# Patient Record
Sex: FEMALE | Race: WHITE
[De-identification: names, ages, dates, MRNs, and addresses within clinical notes are randomized per-mention and may not be internally consistent; named-entity substitution may affect disease eponyms.]

---

## 2017-01-17 ENCOUNTER — HOSPITAL ENCOUNTER (EMERGENCY)
Dept: HOSPITAL 17 - NEPA | Age: 47
Discharge: HOME | End: 2017-01-17
Payer: COMMERCIAL

## 2017-01-17 VITALS
OXYGEN SATURATION: 99 % | TEMPERATURE: 98.3 F | SYSTOLIC BLOOD PRESSURE: 163 MMHG | DIASTOLIC BLOOD PRESSURE: 84 MMHG | RESPIRATION RATE: 12 BRPM | HEART RATE: 71 BPM

## 2017-01-17 VITALS
SYSTOLIC BLOOD PRESSURE: 161 MMHG | TEMPERATURE: 98.4 F | OXYGEN SATURATION: 98 % | RESPIRATION RATE: 16 BRPM | DIASTOLIC BLOOD PRESSURE: 74 MMHG | HEART RATE: 65 BPM

## 2017-01-17 VITALS — HEIGHT: 69 IN | WEIGHT: 231.49 LBS | BODY MASS INDEX: 34.29 KG/M2

## 2017-01-17 VITALS — DIASTOLIC BLOOD PRESSURE: 61 MMHG | SYSTOLIC BLOOD PRESSURE: 126 MMHG

## 2017-01-17 VITALS — OXYGEN SATURATION: 98 %

## 2017-01-17 DIAGNOSIS — Z86.74: ICD-10-CM

## 2017-01-17 DIAGNOSIS — Z95.810: ICD-10-CM

## 2017-01-17 DIAGNOSIS — I44.7: ICD-10-CM

## 2017-01-17 DIAGNOSIS — R00.1: ICD-10-CM

## 2017-01-17 DIAGNOSIS — I44.0: ICD-10-CM

## 2017-01-17 DIAGNOSIS — R07.9: Primary | ICD-10-CM

## 2017-01-17 LAB
ANION GAP SERPL CALC-SCNC: 8 MEQ/L (ref 5–15)
APTT BLD: 26.5 SEC (ref 24.3–30.1)
BASOPHILS # BLD AUTO: 0 TH/MM3 (ref 0–0.2)
BASOPHILS NFR BLD: 0.6 % (ref 0–2)
BUN SERPL-MCNC: 23 MG/DL (ref 7–18)
CHLORIDE SERPL-SCNC: 103 MEQ/L (ref 98–107)
CK SERPL-CCNC: 52 U/L (ref 26–192)
EOSINOPHIL # BLD: 0.2 TH/MM3 (ref 0–0.4)
EOSINOPHIL NFR BLD: 3.1 % (ref 0–4)
ERYTHROCYTE [DISTWIDTH] IN BLOOD BY AUTOMATED COUNT: 13.8 % (ref 11.6–17.2)
GFR SERPLBLD BASED ON 1.73 SQ M-ARVRAT: 82 ML/MIN (ref 89–?)
HCO3 BLD-SCNC: 27.2 MEQ/L (ref 21–32)
HCT VFR BLD CALC: 39.7 % (ref 35–46)
HEMO FLAGS: (no result)
INR PPP: 1 RATIO
LYMPHOCYTES # BLD AUTO: 2.8 TH/MM3 (ref 1–4.8)
LYMPHOCYTES NFR BLD AUTO: 34.5 % (ref 9–44)
MAGNESIUM SERPL-MCNC: 2.1 MG/DL (ref 1.5–2.5)
MCH RBC QN AUTO: 29.8 PG (ref 27–34)
MCHC RBC AUTO-ENTMCNC: 34 % (ref 32–36)
MCV RBC AUTO: 87.7 FL (ref 80–100)
MONOCYTES NFR BLD: 8 % (ref 0–8)
NEUTROPHILS # BLD AUTO: 4.4 TH/MM3 (ref 1.8–7.7)
NEUTROPHILS NFR BLD AUTO: 53.8 % (ref 16–70)
PLATELET # BLD: 183 TH/MM3 (ref 150–450)
POTASSIUM SERPL-SCNC: 4.4 MEQ/L (ref 3.5–5.1)
PROTHROMBIN TIME: 10.6 SEC (ref 9.8–11.6)
RBC # BLD AUTO: 4.52 MIL/MM3 (ref 4–5.3)
SODIUM SERPL-SCNC: 138 MEQ/L (ref 136–145)
WBC # BLD AUTO: 8.1 TH/MM3 (ref 4–11)

## 2017-01-17 PROCEDURE — 84484 ASSAY OF TROPONIN QUANT: CPT

## 2017-01-17 PROCEDURE — 71010: CPT

## 2017-01-17 PROCEDURE — 82550 ASSAY OF CK (CPK): CPT

## 2017-01-17 PROCEDURE — 85610 PROTHROMBIN TIME: CPT

## 2017-01-17 PROCEDURE — 85730 THROMBOPLASTIN TIME PARTIAL: CPT

## 2017-01-17 PROCEDURE — 93005 ELECTROCARDIOGRAM TRACING: CPT

## 2017-01-17 PROCEDURE — 83735 ASSAY OF MAGNESIUM: CPT

## 2017-01-17 PROCEDURE — 85025 COMPLETE CBC W/AUTO DIFF WBC: CPT

## 2017-01-17 PROCEDURE — 80048 BASIC METABOLIC PNL TOTAL CA: CPT

## 2017-01-17 NOTE — PD
HPI


Chief Complaint:  Cardiac Complaint


Time Seen by Provider:  10:18


Travel History


International Travel<30 days:  No


Contact w/Intl Traveler<30days:  No


Traveled to known affect area:  No





History of Present Illness


HPI


46-year-old female came to the emergency room because she thinks that last 

night her AICD might have fired since she felt a sharp twinge.  Patient had the 

AICD put in last October after she had a cardiac arrest and was resuscitated.  

Currently she is not having any chest pain.  She is quite anxious though.  

Vital signs are stable. No h/o cardiac stents.





[facility]


Past Medical History


*** Narrative Medical


List of her past medical history as reviewed from the nursing note.


Cardiovascular Problems:  Yes


Diabetes:  No


Diminished Hearing:  No


Immunizations Current:  Yes


Menopausal:  No





Past Surgical History


AICD:  Yes (Placed in October - [place])


Appendectomy:  Yes


Body Medical Devices:  DEFIBRILLATOR





Social History


Alcohol Use:  Yes (ONCE A WK)


Tobacco Use:  No


Substance Use:  No





Allergies-Medications


(Allergen,Severity, Reaction):  


Coded Allergies:  


     No Known Allergies (Verified , 11/28/16)


     UNOBTAINABLE (Unverified , 11/28/16)


Comments


Unknown


Reported Meds & Prescriptions





Reported Meds & Active Scripts


Active


Aldactone (Spironolactone) 25 Mg Tab 25 Mg PO BID@09,18


Lisinopril 5 Mg Tab 5 Mg PO DAILY


Amiodarone (Amiodarone HCl) 200 Mg Tab 200 Mg PO Q12HR


Metoprolol Tartrate 25 Mg Tab 12.5 Mg PO BID


Reported


Aspirin Children's (Aspirin) 81 Mg Chew 81 Mg CHEW DAILY





Narrative Medication


List of her past medical history is reviewed from the nursing note.





Review of Systems


Except as stated in HPI:  all other systems reviewed are Neg





Physical Exam


Narrative


GENERAL: Awake, alert, obese, anxious


SKIN: Warm and dry.


HEAD: Atraumatic. Normocephalic. 


EYES: Pupils equal and round. No scleral icterus. No injection or drainage. 


ENT: No nasal bleeding or discharge.  Mucous membranes pink and moist.


NECK: Trachea midline. No JVD. 


CARDIOVASCULAR: Regular rate and rhythm.  No murmur appreciated.


RESPIRATORY: No accessory muscle use. Clear to auscultation. Breath sounds 

equal bilaterally. 


GASTROINTESTINAL: Abdomen soft, non-tender, nondistended. Hepatic and splenic 

margins not palpable. 


MUSCULOSKELETAL: No obvious deformities. No clubbing.  No cyanosis.  No edema. 


NEUROLOGICAL: Awake and alert. No obvious cranial nerve deficits.  Motor 

grossly within normal limits. Normal speech.


PSYCHIATRIC: Appropriate mood and affect; insight and judgment normal.





Data


Data


Last Documented VS





Vital Signs








  Date Time  Temp Pulse Resp B/P Pulse Ox O2 Delivery O2 Flow Rate FiO2


 


1/17/17 13:04  57 20 126/61 100   


 


1/17/17 10:33      Room Air  


 


1/17/17 10:22 98.4       








Orders





 Electrocardiogram (1/17/17 )


Basic Metabolic Panel (Bmp) (1/17/17 10:30)


Ckmb (Isoenzyme) Profile (1/17/17 10:30)


Complete Blood Count With Diff (1/17/17 10:30)


Magnesium (Mg) (1/17/17 10:30)


Prothrombin Time / Inr (Pt) (1/17/17 10:30)


Act Partial Throm Time (Ptt) (1/17/17 10:30)


Troponin I (1/17/17 10:30)


Chest, Single Ap (1/17/17 10:30)


Ecg Monitoring (1/17/17 10:30)


Bilateral Bp Monitoring (1/17/17 10:30)


Iv Access Insert/Monitor (1/17/17 10:30)


Oximetry (1/17/17 10:30)


Oxygen Administration (1/17/17 10:30)


Aspirin Chew (Aspirin Chew) (1/17/17 10:30)


Sodium Chloride 0.9% Flush (Ns Flush) (1/17/17 10:30)





Labs





 Laboratory Tests








Test 1/17/17





 10:50


 


White Blood Count 8.1 TH/MM3


 


Red Blood Count 4.52 MIL/MM3


 


Hemoglobin 13.5 GM/DL


 


Hematocrit 39.7 %


 


Mean Corpuscular Volume 87.7 FL


 


Mean Corpuscular Hemoglobin 29.8 PG


 


Mean Corpuscular Hemoglobin 34.0 %





Concent 


 


Red Cell Distribution Width 13.8 %


 


Platelet Count 183 TH/MM3


 


Mean Platelet Volume 8.9 FL


 


Neutrophils (%) (Auto) 53.8 %


 


Lymphocytes (%) (Auto) 34.5 %


 


Monocytes (%) (Auto) 8.0 %


 


Eosinophils (%) (Auto) 3.1 %


 


Basophils (%) (Auto) 0.6 %


 


Neutrophils # (Auto) 4.4 TH/MM3


 


Lymphocytes # (Auto) 2.8 TH/MM3


 


Monocytes # (Auto) 0.6 TH/MM3


 


Eosinophils # (Auto) 0.2 TH/MM3


 


Basophils # (Auto) 0.0 TH/MM3


 


CBC Comment DIFF FINAL 


 


Differential Comment  


 


Prothrombin Time 10.6 SEC


 


Prothromb Time International 1.0 RATIO





Ratio 


 


Activated Partial 26.5 SEC





Thromboplast Time 


 


Sodium Level 138 MEQ/L


 


Potassium Level 4.4 MEQ/L


 


Chloride Level 103 MEQ/L


 


Carbon Dioxide Level 27.2 MEQ/L


 


Anion Gap 8 MEQ/L


 


Blood Urea Nitrogen 23 MG/DL


 


Creatinine 0.76 MG/DL


 


Estimat Glomerular Filtration 82 ML/MIN





Rate 


 


Random Glucose 72 MG/DL


 


Calcium Level 8.3 MG/DL


 


Magnesium Level 2.1 MG/DL


 


Total Creatine Kinase 52 U/L


 


Troponin I 0.04 NG/ML











MDM


Medical Decision Making


Medical Screen Exam Complete:  Yes


Emergency Medical Condition:  Yes


Medical Record Reviewed:  Yes


Interpretation(s)


Twelve-lead EKG was reviewed by me.  Normal sinus rhythm, left axis deviation, 

first-degree AV block, interventricular conduction delay.  Heart rate of 62 bpm.


Differential Diagnosis


AICD firing, chest wall pain


Narrative Course


11:50 AM blood test results of back and within acceptable limits.  We have put 

a call out for [employer] which is her make of the device.  I have asked them to 

come and interrogate the device to see if it was fired.





12:01 PM awaiting for [employer] to come and interrogate her device.





12:29 PM the representative from [employer] came and let me know that after 

interrogation he did not find the device firing.  I will discharge the patient 

home and have her follow up with her cardiologist.





Procedures


EKG Prior to Arrival:  Yes





Diagnosis





 Primary Impression:  


 Nonspecific chest pain


Referrals:  


Rad Guan MD


2 days





***Additional Instructions:


Please return to the ER if the condition worsens or any other new concerns.  

Otherwise follow-up with your cardiologist in couple days.


***Med/Other Pt SpecificInfo:  No Change to Meds


Disposition:  01 DISCHARGE HOME


Condition:  Stable








Laxmi Norton MD Jan 17, 2017 10:19

## 2017-01-17 NOTE — RADRPT
EXAM DATE/TIME:  01/17/2017 10:30 

 

[facility] COMPARISON:     

CHEST SINGLE AP, November 21, 2016, 13:26.

 

                     

INDICATIONS:     

Chest pain.

                     

 

MEDICAL HISTORY:     

None.          

 

SURGICAL HISTORY:     

Pacemaker.   

 

ENCOUNTER:     

Initial                                        

 

ACUITY:     

1 day      

 

PAIN SCORE:     

4/10

 

LOCATION:     

Left upper chest 

 

FINDINGS:     

Patient has a single lead pacemaker in place from the left subclavian approach.  The heart size is mi
ldly enlarged.  The lungs are grossly clear.  No pleural effusion is seen.  

 

CONCLUSION:     

 

1.  No acute abnormality seen.

2.  Cardiomegaly with a pacing device in place. 

 

 Jorge Luis Fernández MD on January 17, 2017 at 10:58                

Board Certified Radiologist.

 This report was verified electronically.

## 2017-01-18 NOTE — EKG
Date Performed: 01/17/2017       Time Performed: 10:31:25

 

PTAGE:      46 years

 

EKG:      Sinus bradycardia Left axis deviation Left bundle branch block First degree AV block Since 
previous tracing, no significant change noted ABNORMAL ECG

 

PREVIOUS TRACING       : 11/21/2016 16.52

 

DOCTOR:   Charly Mckeon  Interpretating Date/Time  01/18/2017 07:57:33

## 2017-07-06 ENCOUNTER — HOSPITAL ENCOUNTER (EMERGENCY)
Dept: HOSPITAL 17 - PHED | Age: 47
Discharge: HOME | End: 2017-07-06
Payer: MEDICAID

## 2017-07-06 VITALS
DIASTOLIC BLOOD PRESSURE: 53 MMHG | OXYGEN SATURATION: 99 % | RESPIRATION RATE: 20 BRPM | HEART RATE: 54 BPM | SYSTOLIC BLOOD PRESSURE: 112 MMHG

## 2017-07-06 VITALS
RESPIRATION RATE: 16 BRPM | HEART RATE: 69 BPM | TEMPERATURE: 98 F | OXYGEN SATURATION: 97 % | SYSTOLIC BLOOD PRESSURE: 137 MMHG | DIASTOLIC BLOOD PRESSURE: 85 MMHG

## 2017-07-06 VITALS
DIASTOLIC BLOOD PRESSURE: 54 MMHG | HEART RATE: 63 BPM | OXYGEN SATURATION: 98 % | RESPIRATION RATE: 20 BRPM | SYSTOLIC BLOOD PRESSURE: 92 MMHG

## 2017-07-06 VITALS — BODY MASS INDEX: 38.37 KG/M2 | WEIGHT: 259.04 LBS | HEIGHT: 69 IN

## 2017-07-06 VITALS — OXYGEN SATURATION: 98 %

## 2017-07-06 VITALS
DIASTOLIC BLOOD PRESSURE: 65 MMHG | RESPIRATION RATE: 20 BRPM | OXYGEN SATURATION: 97 % | SYSTOLIC BLOOD PRESSURE: 132 MMHG | HEART RATE: 53 BPM

## 2017-07-06 DIAGNOSIS — K29.00: Primary | ICD-10-CM

## 2017-07-06 LAB
ALP SERPL-CCNC: 101 U/L (ref 45–117)
ALT SERPL-CCNC: 358 U/L (ref 10–53)
ANION GAP SERPL CALC-SCNC: 8 MEQ/L (ref 5–15)
APTT BLD: 26.1 SEC (ref 24.3–30.1)
AST SERPL-CCNC: 340 U/L (ref 15–37)
BASOPHILS # BLD AUTO: 0 TH/MM3 (ref 0–0.2)
BASOPHILS NFR BLD: 0.2 % (ref 0–2)
BILIRUB SERPL-MCNC: 1.6 MG/DL (ref 0.2–1)
BUN SERPL-MCNC: 19 MG/DL (ref 7–18)
CHLORIDE SERPL-SCNC: 104 MEQ/L (ref 98–107)
EOSINOPHIL # BLD: 0.2 TH/MM3 (ref 0–0.4)
EOSINOPHIL NFR BLD: 2.1 % (ref 0–4)
ERYTHROCYTE [DISTWIDTH] IN BLOOD BY AUTOMATED COUNT: 13.2 % (ref 11.6–17.2)
GFR SERPLBLD BASED ON 1.73 SQ M-ARVRAT: 66 ML/MIN (ref 89–?)
HCO3 BLD-SCNC: 26.7 MEQ/L (ref 21–32)
HCT VFR BLD CALC: 40.8 % (ref 35–46)
HEMO FLAGS: (no result)
INR PPP: 0.9 RATIO
LYMPHOCYTES # BLD AUTO: 1.7 TH/MM3 (ref 1–4.8)
LYMPHOCYTES NFR BLD AUTO: 21.9 % (ref 9–44)
MCH RBC QN AUTO: 31.1 PG (ref 27–34)
MCHC RBC AUTO-ENTMCNC: 34.2 % (ref 32–36)
MCV RBC AUTO: 90.8 FL (ref 80–100)
MONOCYTES NFR BLD: 5.5 % (ref 0–8)
NEUTROPHILS # BLD AUTO: 5.4 TH/MM3 (ref 1.8–7.7)
NEUTROPHILS NFR BLD AUTO: 70.3 % (ref 16–70)
PLATELET # BLD: 156 TH/MM3 (ref 150–450)
POTASSIUM SERPL-SCNC: 4.3 MEQ/L (ref 3.5–5.1)
PROTHROMBIN TIME: 10.3 SEC (ref 9.8–11.6)
RBC # BLD AUTO: 4.49 MIL/MM3 (ref 4–5.3)
SODIUM SERPL-SCNC: 139 MEQ/L (ref 136–145)
WBC # BLD AUTO: 7.7 TH/MM3 (ref 4–11)

## 2017-07-06 PROCEDURE — 83690 ASSAY OF LIPASE: CPT

## 2017-07-06 PROCEDURE — 85610 PROTHROMBIN TIME: CPT

## 2017-07-06 PROCEDURE — 85025 COMPLETE CBC W/AUTO DIFF WBC: CPT

## 2017-07-06 PROCEDURE — 85730 THROMBOPLASTIN TIME PARTIAL: CPT

## 2017-07-06 PROCEDURE — 96361 HYDRATE IV INFUSION ADD-ON: CPT

## 2017-07-06 PROCEDURE — 96374 THER/PROPH/DIAG INJ IV PUSH: CPT

## 2017-07-06 PROCEDURE — C9113 INJ PANTOPRAZOLE SODIUM, VIA: HCPCS

## 2017-07-06 PROCEDURE — 80053 COMPREHEN METABOLIC PANEL: CPT

## 2017-07-06 PROCEDURE — 93005 ELECTROCARDIOGRAM TRACING: CPT

## 2017-07-06 PROCEDURE — 99285 EMERGENCY DEPT VISIT HI MDM: CPT

## 2017-07-06 PROCEDURE — 96375 TX/PRO/DX INJ NEW DRUG ADDON: CPT

## 2017-07-06 PROCEDURE — 74177 CT ABD & PELVIS W/CONTRAST: CPT
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
## 2017-07-06 NOTE — EKG
Date Performed: 07/06/2017       Time Performed: 11:12:27

 

PTAGE:      46 years

 

EKG:      SINUS BRADYCARDIA WITH FIRST DEGREE AV BLOCK MARKED LEFT AXIS DEVIATION LEFT BUNDLE BRANCH 
BLOCK ABNORMAL ECG

 

PREVIOUS TRACING       : 01/17/2017 10.31 Compared to prior tracing no significant change

 

DOCTOR:   Sherry Sánchez  Interpretating Date/Time  07/06/2017 18:00:12

## 2017-07-06 NOTE — RADRPT
EXAM DATE/TIME:  07/06/2017 12:07 

 

[facility] COMPARISON:     

CT ABDOMEN & PELVIS W CONTRAST, October 09, 2016, 14:03.

 

 

INDICATIONS :     

Epigastric pain.  Nausea and vomiting. 

                      

 

IV CONTRAST:     

85 cc Omnipaque 350 (iohexol) IV 

 

 

ORAL CONTRAST:      

No oral contrast ingested.

                      

 

RADIATION DOSE:     

22.34 CTDIvol (mGy) 

 

 

MEDICAL HISTORY :     

Hypertension. Congestive heart failure. Cervical cancer. 

 

SURGICAL HISTORY :      

Appendectomy. Defibrillator.

 

ENCOUNTER:      

Initial

 

ACUITY:      

2 days

 

PAIN SCALE:      

5/10

 

LOCATION:         

Epigastric

 

TECHNIQUE:     

Volumetric scanning of the abdomen and pelvis was performed.  Using automated exposure control and ad
justment of the mA and/or kV according to patient size, radiation dose was kept as low as reasonably 
achievable to obtain optimal diagnostic quality images.  DICOM format image data is available electro
nically for review and comparison.  

 

FINDINGS:     

 

LOWER LUNGS:     

The visualized lower lungs are clear. Pacing wires are present in the right heart.

 

LIVER:     

In the right posterior liver in the region of segment 7 there is a hypodense lobulated mass measuring
 2.8 x 2.6 cm. There is potentially peripheral nodular enhancement. This lesion appears larger than o
n the prior study however the lesion also had the suggestion of peripheral nodular enhancement.  Ther
e is no dilation of the biliary tree.  No calcified gallstones.

 

SPLEEN:     

Normal size without lesion.

 

PANCREAS:     

Within normal limits.

 

KIDNEYS:     

Normal in size and shape.  There is no mass, stone or hydronephrosis.

 

ADRENAL GLANDS:     

There is a left adrenal gland mass measuring 19 mm. Hounsfield measurements are not indicative of a a
n adenoma on this examination. Right adrenal gland is normal. The left adrenal gland mass is stable.

 

VASCULAR:     

There is no aortic aneurysm. There is mild atherosclerotic disease.

 

BOWEL/MESENTERY:     

The stomach, small bowel, and colon demonstrate no acute abnormality.  There is no free intraperitone
al air or fluid.

 

ABDOMINAL WALL:     

Within normal limits.

 

RETROPERITONEUM:     

There is no lymphadenopathy. 

 

BLADDER:     

No wall thickening or mass. 

 

REPRODUCTIVE:     

Within normal limits.

 

INGUINAL:     

There is no lymphadenopathy or hernia. 

 

MUSCULOSKELETAL:     

No acute osseous abnormality is identified. There is sclerosis in the left sacrum adjacent to the SI 
joint.

 

CONCLUSION:     

1. No acute finding is identified in the abdomen or pelvis to explain the clinical symptoms.

2. There is a 2.8 cm a right lobe liver lesion in segment 7. It appears slightly larger than on the p
rior study. Imaging features could potentially represent a hemangioma. Based on the procedure in size
 and nonspecific enhancement pattern suggest liver MRI with and without intravenous contrast on an el
ective basis.

3. Stable 19 mm left adrenal gland mass. It does not meet criteria for an adenoma on this study. The 
stability since October 2016 supports a benign process, possibly an adenoma. If MRI is performed for 
the liver lesion attention can be paid to this lesion at that time.

 

 

 

 Jorge Luis Ontiveros MD on July 06, 2017 at 12:37           

Board Certified Radiologist.

 This report was verified electronically.

## 2017-11-19 ENCOUNTER — HOSPITAL ENCOUNTER (EMERGENCY)
Dept: HOSPITAL 17 - PHEFT | Age: 47
Discharge: HOME | End: 2017-11-19
Payer: MEDICAID

## 2017-11-19 VITALS — HEIGHT: 69 IN | BODY MASS INDEX: 39.51 KG/M2 | WEIGHT: 266.76 LBS

## 2017-11-19 VITALS
DIASTOLIC BLOOD PRESSURE: 65 MMHG | RESPIRATION RATE: 16 BRPM | OXYGEN SATURATION: 95 % | HEART RATE: 64 BPM | TEMPERATURE: 98.4 F | SYSTOLIC BLOOD PRESSURE: 135 MMHG

## 2017-11-19 DIAGNOSIS — F17.210: ICD-10-CM

## 2017-11-19 DIAGNOSIS — I10: ICD-10-CM

## 2017-11-19 DIAGNOSIS — J02.9: Primary | ICD-10-CM

## 2017-11-19 PROCEDURE — 99283 EMERGENCY DEPT VISIT LOW MDM: CPT

## 2017-11-19 NOTE — PD
HPI


Chief Complaint:  ENT Complaint


Time Seen by Provider:  11:04


Travel History


International Travel<30 days:  No


Contact w/Intl Traveler<30days:  No


Traveled to known affect area:  No





History of Present Illness


HPI


47-year-old female here for evaluation of sore throat and swollen glands times 

one day.  She is reporting subjective fever.  She denies difficulty swallowing 

or change in voice.  Symptom severity is mild.  No alleviating factors.





PFSH


Past Medical History


Hx Anticoagulant Therapy:  Yes (ASPIRIN)


Cardiovascular Problems:  Yes (CARDIAC ARREST )


Diabetes:  No


Diminished Hearing:  No


Hypertension:  Yes


Immunizations Current:  Yes


Pregnant?:  Not Pregnant


Menopausal:  No





Past Surgical History


AICD:  Yes (BIOTRONIK ITREVIA 7 VR-T DX, REF#[id number], S#[id number], PID#[id number])


Appendectomy:  Yes


Body Medical Devices:  DEFIBRILLATOR


Cardiac Surgery:  Yes (OPEN HEART X'S 2 )





Social History


Alcohol Use:  Yes (occ)


Tobacco Use:  Yes (2 CIGARETTES PER DAY. )


Substance Use:  No





Allergies-Medications


(Allergen,Severity, Reaction):  


Coded Allergies:  


     No Known Allergies (Verified  Adverse Reaction, Unknown, 11/19/17)


Reported Meds & Prescriptions





Reported Meds & Active Scripts


Active


Aldactone (Spironolactone) 25 Mg Tab 25 Mg PO BID@09,18


Amiodarone (Amiodarone HCl) 200 Mg Tab 200 Mg PO Q12HR


Reported


Metoprolol Tartrate 25 Mg Tab 12.5 Mg PO BID








Review of Systems


Except as stated in HPI:  all other systems reviewed are Neg


General / Constitutional:  Positive: Fever


Eyes:  No: Visual changes


HENT:  Positive: Sore Throat, No: Headaches


Cardiovascular:  No: Chest Pain or Discomfort


Respiratory:  No: Shortness of Breath


Gastrointestinal:  No: Abdominal Pain


Genitourinary:  No: Dysuria


Musculoskeletal:  No: Pain


Skin:  No Rash





Physical Exam


Narrative


GENERAL: Well-nourished, well-developed patient.


SKIN: Focused skin assessment warm/dry.  No rash


HEAD: Normocephalic.


EYES: No scleral icterus. No injection or drainage. 


THROAT: Posterior pharyngeal erythema with tonsillar hypertrophy without 

exudate.  Uvula is midline.  Airways patent.


NECK: Supple, trachea midline.  Mildly enlarged tender submandibular lymph 

nodes.  No meningismus


CARDIOVASCULAR: Regular rate and rhythm without murmurs, gallops, or rubs. 


RESPIRATORY: Breath sounds equal bilaterally. No accessory muscle use.





Data


Data


Last Documented VS





Vital Signs








  Date Time  Temp Pulse Resp B/P (MAP) Pulse Ox O2 Delivery O2 Flow Rate FiO2


 


11/19/17 10:38 98.4 64 16 135/65 (88) 95   











MDM


Medical Decision Making


Medical Screen Exam Complete:  Yes


Emergency Medical Condition:  Yes


Differential Diagnosis


Strep pharyngitis, viral pharyngitis, mononucleosis, URI


Narrative Course


47 year old female here with sore throat and swollen lymph nodes times one day.

  Severity is mild.  Patient is nontoxic-appearing.  Vital signs are stable.  

Airway is patent.





Diagnosis





 Primary Impression:  


 Pharyngitis


 Qualified Codes:  J02.9 - Acute pharyngitis, unspecified


Referrals:  


Primary Care Physician





***Additional Instructions:  


Take over-the-counter Tylenol or Motrin as needed for pain and fever.


Stay well hydrated by drinking plenty fluids.


Take the medication as prescribed.


Scripts


Amoxicillin (Amoxicillin) 500 Mg Cap


500 MG PO TID for Infection for 10 Days, CAP 0 Refills


   Prov: Desirae Kendrick         11/19/17


Disposition:  01 DISCHARGE HOME


Condition:  Stable











Desirae Kednrick Nov 19, 2017 11:26

## 2017-12-28 ENCOUNTER — HOSPITAL ENCOUNTER (EMERGENCY)
Dept: HOSPITAL 17 - PHED | Age: 47
Discharge: HOME | End: 2017-12-28
Payer: MEDICAID

## 2017-12-28 VITALS — HEIGHT: 68 IN | BODY MASS INDEX: 40.76 KG/M2 | WEIGHT: 268.96 LBS

## 2017-12-28 VITALS
SYSTOLIC BLOOD PRESSURE: 149 MMHG | RESPIRATION RATE: 18 BRPM | HEART RATE: 60 BPM | TEMPERATURE: 97.9 F | DIASTOLIC BLOOD PRESSURE: 75 MMHG | OXYGEN SATURATION: 97 %

## 2017-12-28 VITALS
SYSTOLIC BLOOD PRESSURE: 149 MMHG | OXYGEN SATURATION: 97 % | RESPIRATION RATE: 16 BRPM | DIASTOLIC BLOOD PRESSURE: 75 MMHG | HEART RATE: 60 BPM | TEMPERATURE: 97.9 F

## 2017-12-28 DIAGNOSIS — I10: ICD-10-CM

## 2017-12-28 DIAGNOSIS — K11.20: Primary | ICD-10-CM

## 2017-12-28 DIAGNOSIS — Z79.82: ICD-10-CM

## 2017-12-28 PROCEDURE — 99284 EMERGENCY DEPT VISIT MOD MDM: CPT

## 2017-12-28 PROCEDURE — 70490 CT SOFT TISSUE NECK W/O DYE: CPT

## 2017-12-28 NOTE — PD
HPI


Chief Complaint:  ENT Complaint


Time Seen by Provider:  03:50


Travel History


International Travel<30 days:  No


Contact w/Intl Traveler<30days:  No


Traveled to known affect area:  No





History of Present Illness


HPI


This is a 47-year-old female who presents to the emergency department with 

swelling of her right face that started this evening, constant, worsening, 

associated with some pain with swallowing.  She's never had pain like this 

before.  She denies having been scratched by cat.  She denies any fevers or 

chills.





PFSH


Past Medical History


Hx Anticoagulant Therapy:  Yes (ASPIRIN)


Cardiovascular Problems:  Yes


Diabetes:  No


Diminished Hearing:  No


Hypertension:  Yes


Immunizations Current:  Yes


Tetanus Vaccination:  Unknown


Influenza Vaccination:  No


Pregnant?:  Not Pregnant


Menopausal:  No





Past Surgical History


AICD:  Yes (BIOTRONIK ITREVIA 7 VR-T DX, REF#[id number], S#[id number], PID#[id number])


Appendectomy:  Yes


Body Medical Devices:  DEFIBRILLATOR


Cardiac Surgery:  Yes (OPEN HEART X'S 2 )





Social History


Alcohol Use:  No


Tobacco Use:  No


Substance Use:  No





Allergies-Medications


(Allergen,Severity, Reaction):  


Coded Allergies:  


     No Known Allergies (Verified  Adverse Reaction, Unknown, 11/19/17)


Reported Meds & Prescriptions





Reported Meds & Active Scripts


Active


Amoxicillin 500 Mg Cap 500 Mg PO TID 10 Days


Aldactone (Spironolactone) 25 Mg Tab 25 Mg PO BID@09,18


Amiodarone (Amiodarone HCl) 200 Mg Tab 200 Mg PO Q12HR


Reported


Metoprolol Tartrate 25 Mg Tab 12.5 Mg PO BID








Review of Systems


Except as stated in HPI:  all other systems reviewed are Neg





Physical Exam


Narrative


GENERAL:Well appearing, no acute distress


SKIN: Focused skin assessment warm and dry.


HEAD: Atraumatic. Normocephalic. 


EYES: Pupils equal and round.  No injection or drainage. 


ENT:  Moist mucous membranes.  No obvious salivary stone on exam.  Fullness in 

the right neck below the mandible.


NECK: Trachea midline. 


CARDIOVASCULAR: Regular rate and rhythm.  No murmur appreciated.


RESPIRATORY: Clear to auscultation. Breath sounds equal bilaterally. 


GASTROINTESTINAL: Abdomen soft, non-tender, nondistended. 


MUSCULOSKELETAL: No obvious deformities. 


NEUROLOGICAL: Awake and alert. No obvious cranial nerve deficits.  Moving all 

extremities.


PSYCHIATRIC: Appropriate mood and affect; insight and judgment normal.





Data


Data


Last Documented VS





Vital Signs








  Date Time  Temp Pulse Resp B/P (MAP) Pulse Ox O2 Delivery O2 Flow Rate FiO2


 


12/28/17 03:53 97.9 60 16 149/75 (99) 97   








Orders





 Orders


Ct Soft Tiss Neck W/O Iv Cont (12/28/17 )








MDM


Medical Decision Making


Medical Screen Exam Complete:  Yes


Emergency Medical Condition:  Yes


Differential Diagnosis


Sialoadenitis, cat scratch disease, lymphadenopathy, tumor


Narrative Course


This is a 47-year-old female who presents to the emergency department with 

swelling in her right face.  CT scan confirms sialoadenitis.  She is otherwise 

well-appearing.  I think she can be discharged with conservative management.





Diagnosis





 Primary Impression:  


 Sialoadenitis of submandibular gland


Patient Instructions:  General Instructions





***Additional Instructions:  


If you develop fever, chills or worsening swelling or difficulty swallowing or 

breathing return to the emergency room.


***Med/Other Pt SpecificInfo:  No Change to Meds


Disposition:  01 DISCHARGE HOME


Condition:  Stable











aPtience Witt MD Dec 28, 2017 05:02

## 2017-12-28 NOTE — RADRPT
EXAM DATE/TIME:  12/28/2017 04:20 

 

[facility] COMPARISON:     

No previous studies available for comparison.

 

 

INDICATIONS :     

Right sided neck pain in mandible area. Evaluate for stone in gland.

                      

 

RADIATION DOSE:     

22.52 CTDIvol (mGy) 

 

 

MEDICAL HISTORY :     

Cardiovascular disease. Hypertension. Anticoagulant therapy. 

 

SURGICAL HISTORY :       

Open heart surgery. Defibrillator.

 

ENCOUNTER:      

Initial

 

ACUITY:      

1 day

 

PAIN SCORE:      

6/10

 

LOCATION:       

Right neck 

 

TECHNIQUE:     

Volumetric scanning of the neck was performed.  Using automated exposure control and adjustment of th
e mA and/or kV according to patient size, radiation dose was kept as low as reasonably achievable to 
obtain optimal diagnostic quality images.  DICOM format image data is available electronically for re
view and comparison.  

 

FINDINGS:     

There is no evidence for any appreciable pathological adenopathy in the patient's neck. There is an a
pproximate 2-3 mm stone in the right submandibular gland which is also swollen with inflammation surr
ounding the submandibular gland. There is no evidence for abscess. The parotid glands, thyroid glands
 appear intact.  The visceral compartment is grossly intact without infiltrating mass. There is mild 
mucoperiosteal thickening within some of the bilateral maxillary sinuses.

 

CONCLUSION:     Right submandibular gland stone with associated sialadenitis.

 

 

 

 CHE Holguin MD on December 28, 2017 at 4:37           

Board Certified Radiologist.

 This report was verified electronically.

## 2018-01-29 ENCOUNTER — HOSPITAL ENCOUNTER (EMERGENCY)
Dept: HOSPITAL 17 - PHEFT | Age: 48
Discharge: HOME | End: 2018-01-29
Payer: MEDICAID

## 2018-01-29 VITALS — HEIGHT: 68 IN | WEIGHT: 260.15 LBS | BODY MASS INDEX: 39.43 KG/M2

## 2018-01-29 VITALS
TEMPERATURE: 99.5 F | OXYGEN SATURATION: 96 % | RESPIRATION RATE: 16 BRPM | DIASTOLIC BLOOD PRESSURE: 72 MMHG | HEART RATE: 66 BPM | SYSTOLIC BLOOD PRESSURE: 128 MMHG

## 2018-01-29 DIAGNOSIS — J06.9: Primary | ICD-10-CM

## 2018-01-29 DIAGNOSIS — I10: ICD-10-CM

## 2018-01-29 DIAGNOSIS — Z79.82: ICD-10-CM

## 2018-01-29 DIAGNOSIS — Z95.810: ICD-10-CM

## 2018-01-29 PROCEDURE — 87804 INFLUENZA ASSAY W/OPTIC: CPT

## 2018-01-29 PROCEDURE — 99283 EMERGENCY DEPT VISIT LOW MDM: CPT

## 2018-01-29 NOTE — PD
HPI


Chief Complaint:  Cold / Flu Symptoms


Time Seen by Provider:  12:38


Travel History


International Travel<30 days:  No


Contact w/Intl Traveler<30days:  No


Traveled to known affect area:  No





History of Present Illness


HPI


47-year-old female presents for evaluation.  Yesterday the patient developed 

cough, congestion, chills and myalgias. the cough is dry, no aggravating or 

alleviating factors.  Denies abdominal pain, diarrhea, constipation, nausea or 

vomiting, rash, sore throat, recent travel.  No sick contacts.  No other 

complaints at this time.





PFSH


Past Medical History


Hx Anticoagulant Therapy:  Yes (ASPIRIN)


Cardiovascular Problems:  Yes (DEFIBRILATOR , HTN)


Diabetes:  No


Diminished Hearing:  No


Hypertension:  Yes


Immunizations Current:  Yes


Pregnant?:  Unknown


Menopausal:  No





Past Surgical History


AICD:  Yes (BIOTRONIK ITREVIA 7 VR-T DX, REF#[id number], S#[id number], PID#[id number])


Appendectomy:  Yes


Body Medical Devices:  DEFIBRILLATOR


Cardiac Surgery:  Yes (OPEN HEART X'S 2 )





Social History


Alcohol Use:  No


Tobacco Use:  No


Substance Use:  No





Allergies-Medications


(Allergen,Severity, Reaction):  


Coded Allergies:  


     No Known Allergies (Verified  Adverse Reaction, Unknown, 1/29/18)


Reported Meds & Prescriptions





Reported Meds & Active Scripts


Active


Aldactone (Spironolactone) 25 Mg Tab 25 Mg PO BID@09,18


Amiodarone (Amiodarone HCl) 200 Mg Tab 200 Mg PO Q12HR


Reported


Metoprolol Tartrate 25 Mg Tab 12.5 Mg PO DAILY


Lisinopril 5 Mg Tab 5 Mg PO DAILY








Review of Systems


Except as stated in HPI:  all other systems reviewed are Neg





Physical Exam


Narrative


GENERAL: Well-nourished female in no acute distress


SKIN: Warm and dry.


HEAD: Atraumatic. Normocephalic. 


EYES: Pupils equal and round. No scleral icterus. No injection or drainage. 


ENT: No nasal bleeding or discharge.  Mucous membranes pink and moist.


NECK: Trachea midline. No JVD. 


CARDIOVASCULAR: Regular rate and rhythm.  No murmur appreciated.


RESPIRATORY: No accessory muscle use. Clear to auscultation. Breath sounds 

equal bilaterally. 


GASTROINTESTINAL: Abdomen soft, non-tender, nondistended. Hepatic and splenic 

margins not palpable.





Data


Data


Last Documented VS





Vital Signs








  Date Time  Temp Pulse Resp B/P (MAP) Pulse Ox O2 Delivery O2 Flow Rate FiO2


 


1/29/18 12:29 99.5 66 16 128/72 (90) 96   








Orders





 Orders


Influenzae A/B Antigen (1/29/18 12:43)








MDM


Medical Decision Making


Medical Screen Exam Complete:  Yes


Emergency Medical Condition:  Yes


Medical Record Reviewed:  Yes


Differential Diagnosis


Influenza, bronchitis, pneumonia, pharyngitis, sinusitis


Narrative Course


47-year-old female today history of cough, congestion, chills and myalgias.  

She appears well.  ENT examination, respiratory examination are unremarkable.  

An influenza antigen test was performed and it is negative.  I suspect she has 

a viral upper respiratory infection.  She is being discharged with Tessalon.





Diagnosis





 Primary Impression:  


 Upper respiratory infection





***Additional Instructions:  


Medication as needed for cough.  Stay well-hydrated and well-nourished.  Return 

for any emergent medical conditions.


***Med/Other Pt SpecificInfo:  Prescription(s) given


Scripts


Benzonatate (Tessalon Perles) 100 Mg Cap


200 MG PO TID Y for COUGH, #20 CAP 0 Refills


   Prov: Anu Zapata MD         1/29/18


Disposition:  01 DISCHARGE HOME


Condition:  Stable











Devan Jaimes Jan 29, 2018 12:45

## 2018-04-26 ENCOUNTER — HOSPITAL ENCOUNTER (EMERGENCY)
Dept: HOSPITAL 17 - PHEFT | Age: 48
Discharge: HOME | End: 2018-04-26
Payer: MEDICAID

## 2018-04-26 VITALS
HEART RATE: 73 BPM | RESPIRATION RATE: 16 BRPM | SYSTOLIC BLOOD PRESSURE: 115 MMHG | DIASTOLIC BLOOD PRESSURE: 56 MMHG | OXYGEN SATURATION: 97 % | TEMPERATURE: 99 F

## 2018-04-26 VITALS — HEIGHT: 68 IN | WEIGHT: 257.28 LBS | BODY MASS INDEX: 38.99 KG/M2

## 2018-04-26 DIAGNOSIS — J32.9: Primary | ICD-10-CM

## 2018-04-26 DIAGNOSIS — F17.200: ICD-10-CM

## 2018-04-26 DIAGNOSIS — Z79.899: ICD-10-CM

## 2018-04-26 DIAGNOSIS — I10: ICD-10-CM

## 2018-04-26 PROCEDURE — 99283 EMERGENCY DEPT VISIT LOW MDM: CPT

## 2018-04-26 NOTE — PD
HPI


Chief Complaint:  Cold / Flu Symptoms


Time Seen by Provider:  13:33


Travel History


International Travel<30 days:  No


Contact w/Intl Traveler<30days:  No


Traveled to known affect area:  No





History of Present Illness


HPI


47-year-old female here complaining of sinus pain and pressure with 

mucopurulent nasal discharge and fever 4 days.  She reports similar symptoms 

in the past with sinusitis.  Symptom severity is moderate.  No aggravating or 

alleviating factors.





PFSH


Past Medical History


Cardiovascular Problems:  Yes (htn on meds, has a defib)


Diabetes:  No


Diminished Hearing:  No


Hypertension:  Yes


Immunizations Current:  Yes


Tetanus Vaccination:  < 5 Years


Influenza Vaccination:  No


Pregnant?:  Not Pregnant


Menopausal:  No





Past Surgical History


AICD:  Yes (BIOTRONIK ITREVIA 7 VR-T DX, REF#[id number], S#[id number], PID#[id number])


Appendectomy:  Yes


Body Medical Devices:  DEFIBRILLATOR


Cardiac Surgery:  Yes (OPEN HEART X'S 2 )





Social History


Alcohol Use:  Yes (occassionally )


Tobacco Use:  Yes (<1/4 ppd)


Substance Use:  No





Allergies-Medications


(Allergen,Severity, Reaction):  


Coded Allergies:  


     No Known Allergies (Verified  Adverse Reaction, Unknown, 4/26/18)


Reported Meds & Prescriptions





Reported Meds & Active Scripts


Active


Amoxicillin 500 Mg Cap 500 Mg PO TID 10 Days


Flonase Nasal Spray (Fluticasone Nasal Spray) 50 Mcg/Act Spray 100 Mcg EACH 

NARE BID


Aldactone (Spironolactone) 25 Mg Tab 25 Mg PO BID@09,18


Amiodarone (Amiodarone HCl) 200 Mg Tab 200 Mg PO Q12HR


Reported


Metoprolol Tartrate 25 Mg Tab 12.5 Mg PO DAILY


Lisinopril 5 Mg Tab 5 Mg PO DAILY








Review of Systems


Except as stated in HPI:  all other systems reviewed are Neg


General / Constitutional:  Positive: Fever


HENT:  Positive: Rhinorrhea, Congestion


Cardiovascular:  No: Chest Pain or Discomfort


Respiratory:  No: Shortness of Breath


Gastrointestinal:  No: Abdominal Pain


Genitourinary:  No: Dysuria





Physical Exam


Narrative


GENERAL: Alert and well-appearing 47-year-old female


SKIN: Warm and dry.


HEAD: Normocephalic.


EYES: No scleral icterus. No injection or drainage. 


ENT: + Tenderness to frontal and maxillary sinuses.  Mild pharyngeal erythema 

without tonsillar hypertrophy or exudate.  Uvula is midline.  Normal phonation.


NECK: Supple, trachea midline. 


CARDIOVASCULAR: Regular rate and rhythm without murmurs, gallops, or rubs. 


RESPIRATORY: Breath sounds equal bilaterally. No accessory muscle use.


GASTROINTESTINAL: Abdomen soft, non-tender, nondistended. 


MUSCULOSKELETAL: No cyanosis, or edema. 


BACK: No CVA tenderness.








Data


Data


Last Documented VS





Vital Signs








  Date Time  Temp Pulse Resp B/P (MAP) Pulse Ox O2 Delivery O2 Flow Rate FiO2


 


4/26/18 13:05 99.0 73 16 115/56 (75) 97   











MDM


Medical Decision Making


Medical Screen Exam Complete:  Yes


Emergency Medical Condition:  Yes


Differential Diagnosis


Sinusitis, URI, influenza


Narrative Course


47-year-old female here with sinus pain and pressure consistent with sinusitis.

  Requesting medications that are free at [facility].  She will be treated with 

amoxicillin





Diagnosis





 Primary Impression:  


 Sinusitis


 Qualified Codes:  J01.90 - Acute sinusitis, unspecified


Referrals:  


Primary Care Physician


Departure Forms:  Tests/Procedures, Work Release   Enter return to work date:  

Apr 27, 2018





***Additional Instructions:  


Medication as directed.


Follow-up with her primary doctor.


Tylenol and ibuprofen as needed for pain


Scripts


Amoxicillin (Amoxicillin) 500 Mg Cap


500 MG PO TID for Infection for 10 Days, CAP 0 Refills


   Prov: Desirae Kendrick         4/26/18 


Fluticasone Nasal Spray (Flonase Nasal Spray) 50 Mcg/Act Spray


100 MCG EACH NARE BID for Allergies, #1 BOTTLE 0 Refills


   Prov: Desirae Kendrick         4/26/18


Disposition:  01 DISCHARGE HOME


Condition:  Stable











Desirae Kendrick Apr 26, 2018 13:41

## 2018-06-18 ENCOUNTER — HOSPITAL ENCOUNTER (EMERGENCY)
Dept: HOSPITAL 17 - PHEFT | Age: 48
Discharge: HOME | End: 2018-06-18
Payer: MEDICAID

## 2018-06-18 VITALS — HEIGHT: 68 IN | WEIGHT: 265.88 LBS | BODY MASS INDEX: 40.3 KG/M2

## 2018-06-18 VITALS
TEMPERATURE: 98.3 F | DIASTOLIC BLOOD PRESSURE: 63 MMHG | SYSTOLIC BLOOD PRESSURE: 132 MMHG | HEART RATE: 60 BPM | RESPIRATION RATE: 16 BRPM | OXYGEN SATURATION: 98 %

## 2018-06-18 DIAGNOSIS — I10: ICD-10-CM
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
## 2018-06-18 DIAGNOSIS — Z72.0: ICD-10-CM

## 2018-06-18 DIAGNOSIS — M25.561: Primary | ICD-10-CM

## 2018-06-18 PROCEDURE — 99283 EMERGENCY DEPT VISIT LOW MDM: CPT

## 2018-06-18 PROCEDURE — 96372 THER/PROPH/DIAG INJ SC/IM: CPT

## 2018-06-18 PROCEDURE — 73564 X-RAY EXAM KNEE 4 OR MORE: CPT

## 2018-06-18 NOTE — PD
HPI


Chief Complaint:  Injury


Time Seen by Provider:  17:26


Travel History


International Travel<30 days:  No


Contact w/Intl Traveler<30days:  No


Traveled to known affect area:  No





History of Present Illness


HPI


47-year-old female here with right knee pain after prolonged standing and 

walking at [place] yesterday.  She denies specific injury or trauma.  She did 

not twist or fall onto the knee.  She reports this pain slowly developed after 

walking long periods.  She now has pain with weightbearing.  Pain is localized 

to the anterior aspect of the knee.  Symptom severity is moderate.  Slightly 

relieved with rest.





PFSH


Past Medical History


Cardiovascular Problems:  Yes (htn on meds, defib, hx of cardiac arrest)


Diabetes:  No


Diminished Hearing:  No


Hypertension:  Yes


Immunizations Current:  Yes


Tetanus Vaccination:  < 5 Years


Influenza Vaccination:  No


Pregnant?:  Not Pregnant


LMP:  menapause


Menopausal:  Yes





Past Surgical History


AICD:  Yes (BIOTRONIK ITREVIA 7 VR-T DX, REF#[id number], S#[id number], PID#[id number])


Appendectomy:  Yes


Body Medical Devices:  DEFIBRILLATOR


Cardiac Surgery:  Yes (OPEN HEART X'S 2 )





Social History


Alcohol Use:  Yes (occassionally )


Tobacco Use:  Yes (<1/4 ppd)


Substance Use:  No





Allergies-Medications


(Allergen,Severity, Reaction):  


Coded Allergies:  


     No Known Allergies (Verified  Adverse Reaction, Unknown, 6/18/18)


Reported Meds & Prescriptions





Reported Meds & Active Scripts


Active


Diclofenac Sodium DR (Diclofenac Sodium) 75 Mg Tabdr 75 Mg PO DAILY


Flonase Nasal Spray (Fluticasone Nasal Spray) 50 Mcg/Act Spray 100 Mcg EACH 

NARE BID


Aldactone (Spironolactone) 25 Mg Tab 25 Mg PO BID@09,18


Amiodarone (Amiodarone HCl) 200 Mg Tab 200 Mg PO Q12HR


Reported


Metoprolol Tartrate 25 Mg Tab 12.5 Mg PO DAILY


Lisinopril 5 Mg Tab 5 Mg PO DAILY








Review of Systems


Except as stated in HPI:  all other systems reviewed are Neg


General / Constitutional:  No: Fever





Physical Exam


Narrative


GENERAL: Alert and well-appearing 47-year-old female


SKIN: Warm and dry.


HEAD: Normocephalic.


EYES:  No injection or drainage. 


NECK: Supple


CARDIOVASCULAR: Regular rate and rhythm 


RESPIRATORY: Breath sounds equal bilaterally. No accessory muscle use.


GASTROINTESTINAL:  nondistended. 


MUSCULOSKELETAL: No cyanosis, or edema.  Right lower extremity: Tenderness to 

the medial aspect of the knee.  No joint effusion.  No warmth or erythema.  

Full flexion elicits pain.  Palpable distal pulses.  Sensation intact.  Cap 

refill intact.


BACK: Nontender without obvious deformity. No CVA tenderness.








Data


Data


Last Documented VS





Vital Signs








  Date Time  Temp Pulse Resp B/P (MAP) Pulse Ox O2 Delivery O2 Flow Rate FiO2


 


6/18/18 16:48 98.3 60 16 132/63 (86) 98   








Orders





 Orders


Knee, Complete (4vws) (6/18/18 )


Ketorolac Inj (Toradol Inj) (6/18/18 17:45)


^ Knee Immobilizer (6/18/18 19:04)


Ed Discharge Order (6/18/18 19:05)








MDM


Medical Decision Making


Medical Screen Exam Complete:  Yes


Emergency Medical Condition:  Yes


Differential Diagnosis


Knee sprain/strain, arthritis, meniscal injury, fracture


Narrative Course


47-year-old female here with right knee pain and difficulty weightbearing.  The 

extremity is neurovascularly intact.  X-rays negative for fracture.  I suspect 

there is minor ligamental tendon injury.  Knee immobilizer applied.  She is to 

follow-up with her primary doctor.





Diagnosis





 Primary Impression:  


 Right knee pain


 Qualified Codes:  M25.561 - Pain in right knee


Referrals:  


Primary Care Physician





***Additional Instructions:  


Knee immobilizer and crutches for weightbearing,


Medication as directed.


Follow-up with her primary doctor.


Scripts


Diclofenac Sodium DR (Diclofenac Sodium DR) 75 Mg Tabdr


75 MG PO DAILY, #14 TAB 0 Refills


   Prov: Desirae Kendrick         6/18/18


Disposition:  01 DISCHARGE HOME


Condition:  Stable











eDsirae Kendrick Jun 18, 2018 19:04

## 2018-06-18 NOTE — RADRPT
EXAM DATE:  6/18/2018 6:29 PM EDT

AGE/SEX:        47 years / Female



INDICATIONS:  Medial right knee pain.



CLINICAL DATA:  This is the patient's initial encounter. Patient reports that signs and symptoms have
 been present for 2 months and indicates a pain score of 6/10. 

                                                                          

MEDICAL/SURGICAL HISTORY:       . No pertinent history.  Pacemaker.  Appendectomy.



COMPARISON: . 





FINDINGS:  

There is mild osteoarthritis of the right knee. No acute fracture dislocation. No bony destructive ch
anges.



CONCLUSION: 

Mild osteoarthritis of the right knee.



 







Electronically signed by: Grady Ashton MD  6/18/2018 6:32 PM EDT